# Patient Record
Sex: FEMALE | Race: WHITE
[De-identification: names, ages, dates, MRNs, and addresses within clinical notes are randomized per-mention and may not be internally consistent; named-entity substitution may affect disease eponyms.]

---

## 2017-12-04 ENCOUNTER — HOSPITAL ENCOUNTER (OUTPATIENT)
Dept: HOSPITAL 92 - BICMAMMO | Age: 72
Discharge: HOME | End: 2017-12-04
Attending: FAMILY MEDICINE
Payer: MEDICARE

## 2017-12-04 DIAGNOSIS — Z12.31: Primary | ICD-10-CM

## 2017-12-04 PROCEDURE — G0202 SCR MAMMO BI INCL CAD: HCPCS

## 2017-12-04 PROCEDURE — 77067 SCR MAMMO BI INCL CAD: CPT

## 2020-09-17 ENCOUNTER — HOSPITAL ENCOUNTER (OUTPATIENT)
Dept: HOSPITAL 92 - SCSRAD | Age: 75
Discharge: HOME | End: 2020-09-17
Payer: MEDICARE

## 2020-09-17 DIAGNOSIS — R07.81: Primary | ICD-10-CM

## 2020-09-17 NOTE — RAD
Frontal radiograph chest

4 views of right RIBS:

9/17/2020



COMPARISON: None



HISTORY: Right lower anterior rib pain



FINDINGS: Frontal radiograph chest demonstrates no pneumothorax, focal consolidation, or alveolar ryan
ma. The left lung base is not fully imaged.



Clips in the right upper quadrant suggest prior cholecystectomy.



No displaced right-sided rib fracture is seen.



IMPRESSION: No acute findings.



Reported By: Conrad Lomas 

Electronically Signed:  9/17/2020 6:59 PM

## 2020-10-01 ENCOUNTER — HOSPITAL ENCOUNTER (OUTPATIENT)
Dept: HOSPITAL 92 - SCSCT | Age: 75
Discharge: HOME | End: 2020-10-01
Attending: INTERNAL MEDICINE
Payer: MEDICARE

## 2020-10-01 DIAGNOSIS — R22.2: Primary | ICD-10-CM

## 2020-10-01 PROCEDURE — 71250 CT THORAX DX C-: CPT

## 2020-10-01 NOTE — CT
CT OF THE CHEST WITHOUT CONTRAST: 

10/1/20

 

HISTORY: 

Mass under her lower right ribs.

 

TECHNIQUE:  

Multiple contiguous axial images were obtained in a CT of the chest without contrast. Sagittal and co
natalie reformats were performed. 

 

FINDINGS: 

No suspicious pulmonary nodule are seen. No pneumothorax or pleural effusion are seen. 

 

The patient is status post cholecystectomy. The visualized subdiaphragmatic structures are unremarkab
le. Degenerative changes are seen in the spine. 

 

A marker is placed at the area of palpable abnormality along the right lower chest/abdominal wall. No
rmal appearing abdominal musculature is seen at the area of palpable abnormality. No mass or fluid co
llection is seen in this location, but evaluation is limited without IV contrast. There may be slight
 subtle stranding change in the fat planes between the abdominal musculature in this location. 

 

IMPRESSION: 

No definite abdominal mass or fluid collection identified. There may be subtle inflammatory change in
 the fat planes between the abdominal musculature at the area of palpable abnormality which could rep
resent an inflammatory process such as an infection. 

 

POS: [facility]

## 2020-12-02 ENCOUNTER — HOSPITAL ENCOUNTER (OUTPATIENT)
Dept: HOSPITAL 92 - SCSRAD | Age: 75
Discharge: HOME | End: 2020-12-02
Attending: FAMILY MEDICINE
Payer: MEDICARE

## 2020-12-02 DIAGNOSIS — R06.00: Primary | ICD-10-CM

## 2020-12-02 PROCEDURE — 71046 X-RAY EXAM CHEST 2 VIEWS: CPT

## 2020-12-02 NOTE — RAD
XR Chest Pa   Lat STANDARD



HISTORY: Dyspnea



COMPARISON: None



FINDINGS: The heart size is normal. The aorta is tortuous. The lungs are well expanded without focal 
areas of consolidation, pneumothorax or pleural effusions.



IMPRESSION: No radiographic evidence of acute cardiopulmonary process.



Reported By: Vern Rodriguez 

Electronically Signed:  12/2/2020 2:28 PM

## 2021-02-24 ENCOUNTER — HOSPITAL ENCOUNTER (OUTPATIENT)
Dept: HOSPITAL 92 - BICRAD | Age: 76
Discharge: HOME | End: 2021-02-24
Attending: INTERNAL MEDICINE
Payer: MEDICARE

## 2021-02-24 DIAGNOSIS — R06.00: Primary | ICD-10-CM

## 2021-02-24 PROCEDURE — 71046 X-RAY EXAM CHEST 2 VIEWS: CPT

## 2021-02-24 NOTE — RAD
EXAM:

Chest 2 views:



HISTORY:

Dyspnea



COMPARISON:

12/2/2020



FINDINGS:

There is a normal-sized cardiomediastinal silhouette. There is no evidence of consolidation, mass, or
 pleural effusion.   No acute osseous abnormality.



IMPRESSION:

No evidence of acute cardiopulmonary disease



Reported By: Mervin Palacios 

Electronically Signed:  2/24/2021 1:01 PM